# Patient Record
Sex: FEMALE | Race: WHITE | NOT HISPANIC OR LATINO | URBAN - METROPOLITAN AREA
[De-identification: names, ages, dates, MRNs, and addresses within clinical notes are randomized per-mention and may not be internally consistent; named-entity substitution may affect disease eponyms.]

---

## 2023-06-28 ENCOUNTER — EMERGENCY (EMERGENCY)
Facility: HOSPITAL | Age: 68
LOS: 1 days | Discharge: HOME | End: 2023-06-28
Admitting: EMERGENCY MEDICINE
Payer: MEDICARE

## 2023-06-28 VITALS
SYSTOLIC BLOOD PRESSURE: 144 MMHG | WEIGHT: 244.93 LBS | HEIGHT: 67 IN | DIASTOLIC BLOOD PRESSURE: 81 MMHG | HEART RATE: 59 BPM | OXYGEN SATURATION: 95 % | RESPIRATION RATE: 18 BRPM | TEMPERATURE: 98 F
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
## 2023-06-28 DIAGNOSIS — S52.122A DISPLACED FRACTURE OF HEAD OF LEFT RADIUS, INITIAL ENCOUNTER FOR CLOSED FRACTURE: ICD-10-CM
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
## 2023-06-28 DIAGNOSIS — M25.522 PAIN IN LEFT ELBOW: ICD-10-CM

## 2023-06-28 PROCEDURE — 73080 X-RAY EXAM OF ELBOW: CPT | Mod: 26,LT

## 2023-06-28 PROCEDURE — 99284 EMERGENCY DEPT VISIT MOD MDM: CPT

## 2023-06-28 NOTE — ED PROVIDER NOTE - PATIENT PORTAL LINK FT
You can access the FollowMyHealth Patient Portal offered by [facility] by registering at the following website: [email or web address]. By joining [employer]’s FollowMyHealth portal, you will also be able to view your health information using other applications (apps) compatible with our system.

## 2023-06-28 NOTE — ED PROVIDER NOTE - CARE PROVIDER_API CALL
Antonio Jurado  Orthopaedic Surgery  [address] [place] [address]  Phone: [phone]  Fax: [phone]  Follow Up Time: 1-3 Days

## 2023-06-28 NOTE — ED PROVIDER NOTE - OBJECTIVE STATEMENT
69 y/o F who fell 6 days ago and went to [facility] outpatient where she got a CT scan and x-ray of her left elbow but never got her results. She was told to come in to the ED by her PCP to evaluate her nose. Denies LOC. Endorses pain to right elbow.

## 2023-06-28 NOTE — ED ADULT NURSE NOTE - NSFALLRISKINTERV_ED_ALL_ED

## 2023-06-28 NOTE — ED PROVIDER NOTE - CLINICAL SUMMARY MEDICAL DECISION MAKING FREE TEXT BOX
Patient presents for left elbow pain in the setting of fall 6 days ago. On exam ecchymosis over left elbow. Will get imaging.   Imaging shows radial head fracture. Will have patient follow up with orthopedics.

## 2023-06-28 NOTE — ED PROVIDER NOTE - PHYSICAL EXAMINATION
VITAL SIGNS: I have reviewed nursing notes and confirm.  CONST: Well-developed; No apparent distress.  ENT: No nasal discharge; airway clear. No facial bone tenderness.   EYES: Sclera clear. Pupils round and symmetrical bilaterally.  RESP: Breathing comfortably; speaking in full sentences.   MSK: +Ecchymosis to left elbow. No tenderness to cervical/thoracic/lumbar spine to palpation.  NEURO: Alert, oriented. Speech is fluent and appropriate. Moving all extremities appropriately. No gross motor or sensory abnormalities.  SKIN: Skin is normal temperature; no diaphoresis; no pallor.  PSYCH: Cooperative. Appropriate mood, language, and behavior.

## 2023-06-28 NOTE — ED ADULT TRIAGE NOTE - CHIEF COMPLAINT QUOTE
Pt walked in c/o of nose pain after a mechanical trip and fall x 6 days ago. Pt reports having a CT scan done and was told she had a fractured nose. Pt sent in from PCP for further evaluation. + bruising to the left eye and R forearm. Denies LOC/AC use.